# Patient Record
Sex: MALE | Race: BLACK OR AFRICAN AMERICAN | Employment: OTHER
[De-identification: names, ages, dates, MRNs, and addresses within clinical notes are randomized per-mention and may not be internally consistent; named-entity substitution may affect disease eponyms.]

---

## 2024-02-13 ENCOUNTER — HOSPITAL ENCOUNTER (OUTPATIENT)
Facility: HOSPITAL | Age: 54
Setting detail: SPECIMEN
Discharge: HOME | End: 2024-02-16
Payer: MEDICAID

## 2024-02-13 ENCOUNTER — OFFICE VISIT (OUTPATIENT)
Facility: CLINIC | Age: 54
End: 2024-02-13
Payer: MEDICAID

## 2024-02-13 VITALS
TEMPERATURE: 98.9 F | SYSTOLIC BLOOD PRESSURE: 120 MMHG | OXYGEN SATURATION: 96 % | DIASTOLIC BLOOD PRESSURE: 80 MMHG | WEIGHT: 247 LBS | HEIGHT: 67 IN | HEART RATE: 77 BPM | RESPIRATION RATE: 16 BRPM | BODY MASS INDEX: 38.77 KG/M2

## 2024-02-13 DIAGNOSIS — Z11.59 NEED FOR HEPATITIS C SCREENING TEST: ICD-10-CM

## 2024-02-13 DIAGNOSIS — G40.909 SEIZURE DISORDER (HCC): ICD-10-CM

## 2024-02-13 DIAGNOSIS — Z11.4 ENCOUNTER FOR SCREENING FOR HIV: ICD-10-CM

## 2024-02-13 DIAGNOSIS — M25.532 LEFT WRIST PAIN: ICD-10-CM

## 2024-02-13 DIAGNOSIS — I10 ESSENTIAL HYPERTENSION: Primary | ICD-10-CM

## 2024-02-13 DIAGNOSIS — E66.9 OBESITY (BMI 30-39.9): ICD-10-CM

## 2024-02-13 DIAGNOSIS — I10 ESSENTIAL HYPERTENSION: ICD-10-CM

## 2024-02-13 LAB
ALBUMIN SERPL-MCNC: 3.7 G/DL (ref 3.4–5)
ALBUMIN/GLOB SERPL: 1.2 (ref 0.8–1.7)
ALP SERPL-CCNC: 91 U/L (ref 45–117)
ALT SERPL-CCNC: 19 U/L (ref 16–61)
ANION GAP SERPL CALC-SCNC: 5 MMOL/L (ref 3–18)
AST SERPL-CCNC: 10 U/L (ref 10–38)
BILIRUB SERPL-MCNC: 0.4 MG/DL (ref 0.2–1)
BUN SERPL-MCNC: 22 MG/DL (ref 7–18)
BUN/CREAT SERPL: 22 (ref 12–20)
CALCIUM SERPL-MCNC: 9 MG/DL (ref 8.5–10.1)
CHLORIDE SERPL-SCNC: 116 MMOL/L (ref 100–111)
CHOLEST SERPL-MCNC: 167 MG/DL
CO2 SERPL-SCNC: 25 MMOL/L (ref 21–32)
CREAT SERPL-MCNC: 0.98 MG/DL (ref 0.6–1.3)
EST. AVERAGE GLUCOSE BLD GHB EST-MCNC: 126 MG/DL
GLOBULIN SER CALC-MCNC: 3.2 G/DL (ref 2–4)
GLUCOSE SERPL-MCNC: 88 MG/DL (ref 74–99)
HBA1C MFR BLD: 6 % (ref 4.2–5.6)
HDLC SERPL-MCNC: 50 MG/DL (ref 40–60)
HDLC SERPL: 3.3 (ref 0–5)
LDLC SERPL CALC-MCNC: 100.8 MG/DL (ref 0–100)
LIPID PANEL: ABNORMAL
POTASSIUM SERPL-SCNC: 3.7 MMOL/L (ref 3.5–5.5)
PROT SERPL-MCNC: 6.9 G/DL (ref 6.4–8.2)
SODIUM SERPL-SCNC: 146 MMOL/L (ref 136–145)
T4 FREE SERPL-MCNC: 0.9 NG/DL (ref 0.7–1.5)
TRIGL SERPL-MCNC: 81 MG/DL
TSH SERPL DL<=0.05 MIU/L-ACNC: 1.52 UIU/ML (ref 0.36–3.74)
VLDLC SERPL CALC-MCNC: 16.2 MG/DL

## 2024-02-13 PROCEDURE — 99203 OFFICE O/P NEW LOW 30 MIN: CPT | Performed by: FAMILY MEDICINE

## 2024-02-13 PROCEDURE — 80061 LIPID PANEL: CPT

## 2024-02-13 PROCEDURE — 84439 ASSAY OF FREE THYROXINE: CPT

## 2024-02-13 PROCEDURE — 3074F SYST BP LT 130 MM HG: CPT | Performed by: FAMILY MEDICINE

## 2024-02-13 PROCEDURE — 84443 ASSAY THYROID STIM HORMONE: CPT

## 2024-02-13 PROCEDURE — 86803 HEPATITIS C AB TEST: CPT

## 2024-02-13 PROCEDURE — 87389 HIV-1 AG W/HIV-1&-2 AB AG IA: CPT

## 2024-02-13 PROCEDURE — 36415 COLL VENOUS BLD VENIPUNCTURE: CPT

## 2024-02-13 PROCEDURE — 80053 COMPREHEN METABOLIC PANEL: CPT

## 2024-02-13 PROCEDURE — 83036 HEMOGLOBIN GLYCOSYLATED A1C: CPT

## 2024-02-13 PROCEDURE — 3079F DIAST BP 80-89 MM HG: CPT | Performed by: FAMILY MEDICINE

## 2024-02-13 RX ORDER — HYDROCODONE BITARTRATE AND ACETAMINOPHEN 5; 325 MG/1; MG/1
TABLET ORAL
COMMUNITY

## 2024-02-13 RX ORDER — PREGABALIN 150 MG/1
1 CAPSULE ORAL 2 TIMES DAILY
COMMUNITY

## 2024-02-13 RX ORDER — TAMSULOSIN HYDROCHLORIDE 0.4 MG/1
1 CAPSULE ORAL DAILY
COMMUNITY

## 2024-02-13 RX ORDER — DOCUSATE SODIUM 100 MG/1
100 CAPSULE, LIQUID FILLED ORAL 2 TIMES DAILY
COMMUNITY

## 2024-02-13 RX ORDER — LOSARTAN POTASSIUM 25 MG/1
1 TABLET ORAL DAILY
COMMUNITY

## 2024-02-13 RX ORDER — SOLIFENACIN SUCCINATE 10 MG/1
1 TABLET, FILM COATED ORAL DAILY
COMMUNITY

## 2024-02-13 RX ORDER — VITAMIN E 268 MG
400 CAPSULE ORAL DAILY
COMMUNITY

## 2024-02-13 RX ORDER — CELECOXIB 100 MG/1
CAPSULE ORAL
COMMUNITY

## 2024-02-13 RX ORDER — FUROSEMIDE 20 MG/1
1 TABLET ORAL DAILY
COMMUNITY

## 2024-02-13 NOTE — PROGRESS NOTES
\"Have you been to the ER, urgent care clinic since your last visit?  Hospitalized since your last visit?\"    NO    “Have you seen or consulted any other health care providers outside of [facility] since your last visit?”    NO    “Have you had a colorectal cancer screening such as a colonoscopy/FIT/Cologuard?    YES - Type: Colonoscopy - Where: [place], [place] about 3yrs ago  Nurse/CMA to request most recent records if not in the chart     Jarred Sorto presents today for   Chief Complaint   Patient presents with    New Patient       Is someone accompanying this pt? Sister-in-Law     Is the patient using any DME equipment during OV? Cane     Depression Screenin/13/2024     9:42 AM   PHQ-9 Questionaire   Little interest or pleasure in doing things 0   Feeling down, depressed, or hopeless 0   PHQ-9 Total Score 0         2024     9:42 AM   PHQ Scores   PHQ2 Score 0   PHQ9 Score 0       Learning Assessment:  No question data found.    Abuse Screening:       No data to display                Fall Risk       No data to display                OPIOID RISK TOOL       No data to display

## 2024-02-13 NOTE — PROGRESS NOTES
Jarred Sorto (:  1970) is a 54 y.o. male,New patient, here for evaluation of the following chief complaint(s):  New Patient         ASSESSMENT/PLAN:  1. Essential hypertension  -     Lipid Panel; Future  -     Comprehensive Metabolic Panel; Future  -     Hemoglobin A1C; Future  -     TSH + Free T4 Panel; Future  2. Obesity (BMI 30-39.9)  -     Lipid Panel; Future  -     Comprehensive Metabolic Panel; Future  -     Hemoglobin A1C; Future  -     TSH + Free T4 Panel; Future  3. Seizure disorder (HCC)  4. Left wrist pain  5. Need for hepatitis C screening test  -     Hepatitis C Antibody; Future  6. Encounter for screening for HIV  -     HIV 1/2 Ag/Ab, 4TH Generation,W Rflx Confirm; Future      HTN-   Controlled.   Patient to continue current medication and doses.  Continue to modify diet.  Limit salt intake to 2 grams  a day.  Advised  aerobic exercise for 30 minutes 3 to 5 times a week.  Take meds consistently as prescribed.      Obesity- Patient encouraged to exercise for 30 minutes 3 to 5 times a week.  Educated on eating a well balanced healthy diet. (Consistent of lean meats, lots of fruits, vegetables and whole grains).  Limit processed foods.            Return in about 6 months (around 2024) for HTN, obesity, OV extended.         Subjective   SUBJECTIVE/OBJECTIVE:  This is a 54-year-old male who presents to establish care.  He is a former patient of [facility].  He was previously being followed by Dr. Amanda Gallardo.    Patient's care team:  Neurologist-Dr. Eric Goldberg  Pain management-Dr. Marcie Pleitez  ENT-Dr. Cassie Wong        HTN-  The patient's blood pressure was noted to be controlled today.  Initially when he first came in and it was slightly elevated but once he settled down on repeat blood pressure was noted to be 120/80.  He continues with furosemide 20 mg daily and losartan 25 mg daily.  He is no longer taking HCTZ 25 mg.  He is getting less sodium and

## 2024-02-14 LAB
HCV AB SER IA-ACNC: 0.1 INDEX
HCV AB SERPL QL IA: NEGATIVE
HEPATITIS C COMMENT: NORMAL
HIV 1+2 AB+HIV1 P24 AG SERPL QL IA: NONREACTIVE
HIV 1/2 RESULT COMMENT: NORMAL

## 2024-05-07 ENCOUNTER — TELEPHONE (OUTPATIENT)
Facility: CLINIC | Age: 54
End: 2024-05-07

## 2024-05-07 DIAGNOSIS — G47.33 OSA (OBSTRUCTIVE SLEEP APNEA): Primary | ICD-10-CM

## 2024-05-07 NOTE — TELEPHONE ENCOUNTER
Patient's brother is calling in asking to please be referred to someone who can see the patient for sleep apnea.  States they are trying to get the patient back on a cpap machine.

## 2024-05-09 NOTE — TELEPHONE ENCOUNTER
Orders Placed This Encounter    [facility] - Amanda Ledezma DO, Sleep Medicine     Referral Priority:   Routine     Referral Type:   Eval and Treat     Referral Reason:   Specialty Services Required     Referred to Provider:   Amanda Ledezma DO     Requested Specialty:   Sleep Medicine Physician     Number of Visits Requested:   1     Please inform patient or patient's brother that his sleep medicine referral was created.  Someone should be contacting him in regards to this referral.

## 2024-06-10 ENCOUNTER — TELEPHONE (OUTPATIENT)
Facility: CLINIC | Age: 54
End: 2024-06-10

## 2024-06-10 NOTE — TELEPHONE ENCOUNTER
Patient's brother is calling in ststing the pharmcy told him to call the office in refence to the patient refill on   tamsulosin (FLOMAX) 0.4 MG capsule [[id number]]    Order Details  Dose: 1 tablet Route: Oral Frequency: DAILY   Dispense Quantity: -- Refills: --          Sig: Take 1 tablet by mouth daily       States the refill is not due until 6/29 but the patient is out of medication..    [facility] on file

## 2024-06-27 NOTE — TELEPHONE ENCOUNTER
Last Appointment:  2/13/2024  Future Appointments   Date Time Provider Department Center   8/12/2024 10:15 AM Naz Pena MD [facility] BS AMB   11/18/2024  9:00 AM Amanda Ledezma DO [facility] BS AMB

## 2024-06-28 RX ORDER — LOSARTAN POTASSIUM 25 MG/1
25 TABLET ORAL DAILY
Qty: 90 TABLET | Refills: 1 | Status: SHIPPED | OUTPATIENT
Start: 2024-06-28

## 2024-07-11 DIAGNOSIS — N40.1 BENIGN PROSTATIC HYPERPLASIA WITH LOWER URINARY TRACT SYMPTOMS, SYMPTOM DETAILS UNSPECIFIED: Primary | ICD-10-CM

## 2024-07-12 RX ORDER — SOLIFENACIN SUCCINATE 10 MG/1
10 TABLET, FILM COATED ORAL DAILY
Qty: 90 TABLET | Refills: 1 | Status: SHIPPED | OUTPATIENT
Start: 2024-07-12

## 2024-07-12 NOTE — TELEPHONE ENCOUNTER
Last Appointment:  2/13/2024  Future Appointments   Date Time Provider Department Center   8/12/2024 10:15 AM Naz Pena MD [facility] AMB   11/18/2024  9:00 AM Amanda Ledezma DO [facility] BS AMB      
Orders Placed This Encounter    solifenacin (VESICARE) 10 MG tablet     Sig: Take 1 tablet by mouth daily     Dispense:  90 tablet     Refill:  1       
20

## 2024-08-07 DIAGNOSIS — I10 ESSENTIAL HYPERTENSION: Primary | ICD-10-CM

## 2024-08-07 NOTE — TELEPHONE ENCOUNTER
Pharmacy fax request for refill to the [facility] on file.    furosemide (LASIX) 20 MG tablet [[id number]]    Order Details  Dose: 1 tablet Route: Oral Frequency: DAILY   Dispense Quantity: -- Refills: --          Sig: Take 1 tablet by mouth daily     Last Appointment:  2/13/2024  Future Appointments   Date Time Provider Department Center   8/12/2024 10:15 AM Naz Pena MD [facility] DEP   8/12/2024 10:30 AM Naz Pena MD [facility] DEP   11/18/2024  9:00 AM [facility]

## 2024-08-08 RX ORDER — FUROSEMIDE 20 MG/1
20 TABLET ORAL DAILY
Qty: 90 TABLET | Refills: 1 | Status: SHIPPED | OUTPATIENT
Start: 2024-08-08

## 2024-08-12 ENCOUNTER — HOSPITAL ENCOUNTER (OUTPATIENT)
Facility: HOSPITAL | Age: 54
Setting detail: SPECIMEN
Discharge: HOME | End: 2024-08-15
Payer: MEDICARE

## 2024-08-12 ENCOUNTER — OFFICE VISIT (OUTPATIENT)
Facility: CLINIC | Age: 54
End: 2024-08-12
Payer: MEDICARE

## 2024-08-12 VITALS
SYSTOLIC BLOOD PRESSURE: 120 MMHG | WEIGHT: 253 LBS | BODY MASS INDEX: 39.71 KG/M2 | HEIGHT: 67 IN | RESPIRATION RATE: 13 BRPM | OXYGEN SATURATION: 99 % | DIASTOLIC BLOOD PRESSURE: 82 MMHG | TEMPERATURE: 96.9 F | HEART RATE: 52 BPM

## 2024-08-12 DIAGNOSIS — E66.01 SEVERE OBESITY (BMI 35.0-39.9) WITH COMORBIDITY (HCC): ICD-10-CM

## 2024-08-12 DIAGNOSIS — I10 ESSENTIAL HYPERTENSION: Primary | ICD-10-CM

## 2024-08-12 DIAGNOSIS — R73.03 PREDIABETES: ICD-10-CM

## 2024-08-12 DIAGNOSIS — G40.909 SEIZURE DISORDER (HCC): ICD-10-CM

## 2024-08-12 DIAGNOSIS — M25.532 LEFT WRIST PAIN: ICD-10-CM

## 2024-08-12 DIAGNOSIS — G47.33 OSA (OBSTRUCTIVE SLEEP APNEA): ICD-10-CM

## 2024-08-12 DIAGNOSIS — Z00.00 MEDICARE ANNUAL WELLNESS VISIT, SUBSEQUENT: Primary | ICD-10-CM

## 2024-08-12 DIAGNOSIS — I10 ESSENTIAL HYPERTENSION: ICD-10-CM

## 2024-08-12 LAB
ANION GAP SERPL CALC-SCNC: 10 MMOL/L (ref 3–18)
BUN SERPL-MCNC: 13 MG/DL (ref 7–18)
BUN/CREAT SERPL: 14 (ref 12–20)
CALCIUM SERPL-MCNC: 9 MG/DL (ref 8.5–10.1)
CHLORIDE SERPL-SCNC: 112 MMOL/L (ref 100–111)
CO2 SERPL-SCNC: 22 MMOL/L (ref 21–32)
CREAT SERPL-MCNC: 0.9 MG/DL (ref 0.6–1.3)
EST. AVERAGE GLUCOSE BLD GHB EST-MCNC: 126 MG/DL
GLUCOSE SERPL-MCNC: 83 MG/DL (ref 74–99)
HBA1C MFR BLD: 6 % (ref 4.2–5.6)
POTASSIUM SERPL-SCNC: 3.8 MMOL/L (ref 3.5–5.5)
SODIUM SERPL-SCNC: 144 MMOL/L (ref 136–145)

## 2024-08-12 PROCEDURE — 3079F DIAST BP 80-89 MM HG: CPT | Performed by: FAMILY MEDICINE

## 2024-08-12 PROCEDURE — G0439 PPPS, SUBSEQ VISIT: HCPCS | Performed by: FAMILY MEDICINE

## 2024-08-12 PROCEDURE — 83036 HEMOGLOBIN GLYCOSYLATED A1C: CPT

## 2024-08-12 PROCEDURE — 3074F SYST BP LT 130 MM HG: CPT | Performed by: FAMILY MEDICINE

## 2024-08-12 PROCEDURE — 36415 COLL VENOUS BLD VENIPUNCTURE: CPT

## 2024-08-12 PROCEDURE — 99213 OFFICE O/P EST LOW 20 MIN: CPT | Performed by: FAMILY MEDICINE

## 2024-08-12 PROCEDURE — 80048 BASIC METABOLIC PNL TOTAL CA: CPT

## 2024-08-12 SDOH — ECONOMIC STABILITY: FOOD INSECURITY
WITHIN THE PAST 12 MONTHS, YOU WORRIED THAT YOUR FOOD WOULD RUN OUT BEFORE YOU GOT MONEY TO BUY MORE.: PATIENT UNABLE TO ANSWER

## 2024-08-12 SDOH — ECONOMIC STABILITY: FOOD INSECURITY
WITHIN THE PAST 12 MONTHS, THE FOOD YOU BOUGHT JUST DIDN'T LAST AND YOU DIDN'T HAVE MONEY TO GET MORE.: PATIENT UNABLE TO ANSWER

## 2024-08-12 SDOH — ECONOMIC STABILITY: INCOME INSECURITY
HOW HARD IS IT FOR YOU TO PAY FOR THE VERY BASICS LIKE FOOD, HOUSING, MEDICAL CARE, AND HEATING?: PATIENT UNABLE TO ANSWER

## 2024-08-12 ASSESSMENT — PATIENT HEALTH QUESTIONNAIRE - PHQ9
SUM OF ALL RESPONSES TO PHQ QUESTIONS 1-9: 0
1. LITTLE INTEREST OR PLEASURE IN DOING THINGS: NOT AT ALL
SUM OF ALL RESPONSES TO PHQ QUESTIONS 1-9: 0
DEPRESSION UNABLE TO ASSESS: FUNCTIONAL CAPACITY MOTIVATION LIMITS ACCURACY
2. FEELING DOWN, DEPRESSED OR HOPELESS: NOT AT ALL
SUM OF ALL RESPONSES TO PHQ9 QUESTIONS 1 & 2: 0
SUM OF ALL RESPONSES TO PHQ QUESTIONS 1-9: 0
SUM OF ALL RESPONSES TO PHQ QUESTIONS 1-9: 0

## 2024-08-12 ASSESSMENT — LIFESTYLE VARIABLES
HOW MANY STANDARD DRINKS CONTAINING ALCOHOL DO YOU HAVE ON A TYPICAL DAY: PATIENT DOES NOT DRINK
HOW OFTEN DO YOU HAVE A DRINK CONTAINING ALCOHOL: NEVER

## 2024-08-12 ASSESSMENT — ENCOUNTER SYMPTOMS: SHORTNESS OF BREATH: 0

## 2024-08-12 NOTE — PROGRESS NOTES
Jarred Sorto (:  1970) is a 54 y.o. male,Established patient, here for evaluation of the following chief complaint(s):  Follow-up, Obesity, and Hypertension         Assessment & Plan  Essential hypertension  Well-controlled, continue current medications, continue current treatment plan, medication adherence emphasized, and lifestyle modifications recommended    Orders:    Basic Metabolic Panel; Future    Prediabetes  Well-controlled, continue current medications, continue current treatment plan, medication adherence emphasized, and lifestyle modifications recommended    Orders:    Hemoglobin A1C; Future    Severe obesity (BMI 35.0-39.9) with comorbidity (HCC)     -Recommend a low calorie diet  -Patient encouraged to exercise for 30 minutes 3 to 5 times a week.  -Recommend eating a well balanced healthy diet. (Consistent of lean meats, lots of fruits, vegetables and whole grains).    -Limit processed foods.   -Drink 32 to 64 ounces of fluid each day  -Eat 2 to 3 g of fiber each day    Orders:    Basic Metabolic Panel; Future    Seizure disorder (HCC)   Monitored by specialist- no acute findings meriting change in the plan         LAURO (obstructive sleep apnea)   Monitored by specialist- no acute findings meriting change in the plan  Has apt. on 2024 for follow dup.   May need a new CPAP since current one is very old.            Left wrist pain   Uncontrolled, continue current medications.  Referred to Hand specialist for further evaluation and trreatment    Orders:    [facility]      Return in about 6 months (around 2025) for HTN, PREDIABETES, Sz dixorder, LAURO.       Subjective   This is a 54-year-old male who presents to establish care.  He is a former patient of [facility].  He was previously being followed by Dr. Amanda Gallardo.    Patient's care team:  Neurologist-Dr. Eric Goldberg  Pain management-Dr. Jack

## 2024-08-12 NOTE — PROGRESS NOTES
\"Have you been to the ER, urgent care clinic since your last visit?  Hospitalized since your last visit?\"    NO    “Have you seen or consulted any other health care providers outside of [facility] since your last visit?”    YES - When: approximately 1 months ago.  Where and Why: Dentist.        “Have you had a colorectal cancer screening such as a colonoscopy/FIT/Cologuard?    NO    No colonoscopy on file  No cologuard on file  No FIT/FOBT on file   No flexible sigmoidoscopy on file         Click Here for Release of Records Request

## 2024-08-12 NOTE — PROGRESS NOTES
Medicare Annual Wellness Visit    Jarred Sorto is here for Medicare AWV    Assessment & Plan   Medicare annual wellness visit, subsequent    Recommendations for Preventive Services Due: see orders and patient instructions/AVS.  Recommended screening schedule for the next 5-10 years is provided to the patient in written form: see Patient Instructions/AVS.     Return in 1 year (on 8/12/2025) for Medicare AWV.     Subjective       Patient's complete Health Risk Assessment and screening values have been reviewed and are found in Flowsheets. The following problems were reviewed today and where indicated follow up appointments were made and/or referrals ordered.    Positive Risk Factor Screenings with Interventions:        Depression:  PHQ-2 Score: 0  PHQ-9 Total Score: 0          Controlled Medication Review:    Today's Pain Level: Pain Score: Five     Opioid Risk: (Low risk score <55) Opioid risk score: 21    Patient is low risk for opioid use disorder or overdose.    Last PDMP Ayo as Reviewed:  Review User Review Instant Review Result                     Abnormal BMI (obese):  There is no height or weight on file to calculate BMI. (!) Abnormal  Interventions:  See AVS for additional education material                           Objective   There were no vitals filed for this visit.   There is no height or weight on file to calculate BMI.                    Allergies   Allergen Reactions    Bee Pollen      Cogestion     Prior to Visit Medications    Medication Sig Taking? Authorizing Provider   furosemide (LASIX) 20 MG tablet Take 1 tablet by mouth daily  Naz Pena MD   solifenacin (VESICARE) 10 MG tablet Take 1 tablet by mouth daily  Naz Pena MD   losartan (COZAAR) 25 MG tablet Take 1 tablet by mouth daily  Naz Pena MD   HYDROcodone-acetaminophen (NORCO) 5-325 MG per tablet TAKE 1 TABLET BY MOUTH EVERY 6 HOURS FOR 7 DAYS AS NEEDED  Provider, MD Cj   pregabalin (LYRICA) 150

## 2024-08-26 DIAGNOSIS — G62.9 NEUROPATHY: Primary | ICD-10-CM

## 2024-08-26 RX ORDER — PREGABALIN 150 MG/1
150 CAPSULE ORAL 2 TIMES DAILY
Qty: 180 CAPSULE | Refills: 1 | Status: SHIPPED | OUTPATIENT
Start: 2024-08-26 | End: 2025-02-22

## 2024-08-26 NOTE — PROGRESS NOTES
Orders Placed This Encounter    pregabalin (LYRICA) 150 MG capsule     Sig: Take 1 capsule by mouth 2 times daily for 180 days. Max Daily Amount: 300 mg     Dispense:  180 capsule     Refill:  1

## 2024-09-06 ENCOUNTER — OFFICE VISIT (OUTPATIENT)
Age: 54
End: 2024-09-06
Payer: MEDICAID

## 2024-09-06 VITALS — BODY MASS INDEX: 39.43 KG/M2 | HEIGHT: 67 IN | WEIGHT: 251.2 LBS

## 2024-09-06 DIAGNOSIS — M19.132 POST-TRAUMATIC ARTHRITIS OF LEFT WRIST: Primary | ICD-10-CM

## 2024-09-06 PROCEDURE — G8417 CALC BMI ABV UP PARAM F/U: HCPCS | Performed by: ORTHOPAEDIC SURGERY

## 2024-09-06 PROCEDURE — 1036F TOBACCO NON-USER: CPT | Performed by: ORTHOPAEDIC SURGERY

## 2024-09-06 PROCEDURE — 20605 DRAIN/INJ JOINT/BURSA W/O US: CPT | Performed by: ORTHOPAEDIC SURGERY

## 2024-09-06 PROCEDURE — 73110 X-RAY EXAM OF WRIST: CPT | Performed by: ORTHOPAEDIC SURGERY

## 2024-09-06 PROCEDURE — 3017F COLORECTAL CA SCREEN DOC REV: CPT | Performed by: ORTHOPAEDIC SURGERY

## 2024-09-06 PROCEDURE — G8427 DOCREV CUR MEDS BY ELIG CLIN: HCPCS | Performed by: ORTHOPAEDIC SURGERY

## 2024-09-06 PROCEDURE — 99204 OFFICE O/P NEW MOD 45 MIN: CPT | Performed by: ORTHOPAEDIC SURGERY

## 2024-09-06 RX ORDER — HYDROCHLOROTHIAZIDE 25 MG/1
25 TABLET ORAL DAILY
COMMUNITY

## 2024-09-06 RX ORDER — MELOXICAM 7.5 MG/1
7.5 TABLET ORAL DAILY PRN
Qty: 30 TABLET | Refills: 0 | Status: SHIPPED | OUTPATIENT
Start: 2024-09-06

## 2024-09-06 RX ORDER — MELOXICAM 15 MG/1
15 TABLET ORAL DAILY
Qty: 30 TABLET | Refills: 0 | Status: CANCELLED | OUTPATIENT
Start: 2024-09-06

## 2024-09-06 RX ORDER — LIDOCAINE HYDROCHLORIDE 10 MG/ML
0.5 INJECTION, SOLUTION INFILTRATION; PERINEURAL ONCE
Status: COMPLETED | OUTPATIENT
Start: 2024-09-06 | End: 2024-09-06

## 2024-09-06 RX ADMIN — LIDOCAINE HYDROCHLORIDE 0.5 ML: 10 INJECTION, SOLUTION INFILTRATION; PERINEURAL at 10:39
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
## 2024-10-06 DIAGNOSIS — M19.132 POST-TRAUMATIC ARTHRITIS OF LEFT WRIST: ICD-10-CM

## 2024-10-07 RX ORDER — MELOXICAM 7.5 MG/1
7.5 TABLET ORAL DAILY PRN
Qty: 30 TABLET | Refills: 0 | OUTPATIENT
Start: 2024-10-07

## 2024-10-08 RX ORDER — TOPIRAMATE 200 MG/1
200 TABLET, FILM COATED ORAL 2 TIMES DAILY
Qty: 180 TABLET | Refills: 1 | Status: SHIPPED | OUTPATIENT
Start: 2024-10-08 | End: 2025-04-06

## 2024-10-08 NOTE — TELEPHONE ENCOUNTER
Patient is needing refill on      topiramate (TOPAMAX) 200 MG tablet [[id number]]    Order Details  Dose: 1 tablet Route: Oral Frequency: 2 TIMES DAILY   Dispense Quantity: -- Refills: --          Sig: Take 1 tablet by mouth 2 times daily       Future Appointments   Date Time Provider Department Center   11/18/2024  9:00 AM Amanda Ledezma DO [facility] BS [facility]   2/18/2025 10:15 AM Naz Pena MD [facility] DEP

## 2024-10-09 NOTE — TELEPHONE ENCOUNTER
Orders Placed This Encounter    topiramate (TOPAMAX) 200 MG tablet     Sig: Take 1 tablet by mouth 2 times daily     Dispense:  180 tablet     Refill:  1

## 2024-11-05 DIAGNOSIS — M25.532 LEFT WRIST PAIN: Primary | ICD-10-CM

## 2024-11-05 NOTE — TELEPHONE ENCOUNTER
Patient's brother is calling in for a refill to the [facility] on file.      celecoxib (CELEBREX) 100 MG capsule [[id number]]    Order Details  Dose, Route, Frequency: As Directed   Dispense Quantity: -- Refills: --          Sig: TAKE 1 CAPSULE BY MOUTH TWICE DAILY WITH FOOD       Last Appointment:  8/12/2024  Future Appointments   Date Time Provider Department Center   11/18/2024  9:00 AM Amanda Ledezma DO [facility] BS [facility]   2/18/2025 10:15 AM [facility]

## 2024-11-06 ENCOUNTER — COMMUNITY OUTREACH (OUTPATIENT)
Facility: CLINIC | Age: 54
End: 2024-11-06

## 2024-11-06 RX ORDER — CELECOXIB 100 MG/1
100 CAPSULE ORAL 2 TIMES DAILY
Qty: 180 CAPSULE | Refills: 1 | Status: SHIPPED | OUTPATIENT
Start: 2024-11-06

## 2024-11-06 NOTE — TELEPHONE ENCOUNTER
Orders Placed This Encounter    celecoxib (CELEBREX) 100 MG capsule     Sig: Take 1 capsule by mouth 2 times daily     Dispense:  180 capsule     Refill:  1

## 2024-11-18 ENCOUNTER — OFFICE VISIT (OUTPATIENT)
Age: 54
End: 2024-11-18
Payer: MEDICAID

## 2024-11-18 VITALS
WEIGHT: 240.8 LBS | TEMPERATURE: 97.2 F | DIASTOLIC BLOOD PRESSURE: 88 MMHG | BODY MASS INDEX: 37.71 KG/M2 | HEART RATE: 80 BPM | OXYGEN SATURATION: 95 % | RESPIRATION RATE: 18 BRPM | SYSTOLIC BLOOD PRESSURE: 161 MMHG

## 2024-11-18 DIAGNOSIS — G40.909 SEIZURE DISORDER (HCC): ICD-10-CM

## 2024-11-18 DIAGNOSIS — S06.9XAS TRAUMATIC BRAIN INJURY, WITH UNKNOWN LOSS OF CONSCIOUSNESS STATUS, SEQUELA: ICD-10-CM

## 2024-11-18 DIAGNOSIS — G47.19 EXCESSIVE DAYTIME SLEEPINESS: ICD-10-CM

## 2024-11-18 DIAGNOSIS — G47.33 OSA (OBSTRUCTIVE SLEEP APNEA): Primary | ICD-10-CM

## 2024-11-18 DIAGNOSIS — R73.03 PREDIABETES: ICD-10-CM

## 2024-11-18 DIAGNOSIS — E66.9 OBESITY (BMI 30-39.9): ICD-10-CM

## 2024-11-18 PROBLEM — S06.9XAA TBI (TRAUMATIC BRAIN INJURY): Chronic | Status: ACTIVE | Noted: 2020-08-20

## 2024-11-18 PROBLEM — R56.9 SEIZURE (HCC): Status: ACTIVE | Noted: 2024-11-18

## 2024-11-18 PROCEDURE — 99204 OFFICE O/P NEW MOD 45 MIN: CPT | Performed by: OTOLARYNGOLOGY

## 2024-11-18 ASSESSMENT — SLEEP AND FATIGUE QUESTIONNAIRES
HOW LIKELY ARE YOU TO NOD OFF OR FALL ASLEEP WHILE SITTING INACTIVE IN A PUBLIC PLACE: MODERATE CHANCE OF DOZING
HOW LIKELY ARE YOU TO NOD OFF OR FALL ASLEEP WHILE LYING DOWN TO REST IN THE AFTERNOON WHEN CIRCUMSTANCES PERMIT: HIGH CHANCE OF DOZING
ESS TOTAL SCORE: 22
HOW LIKELY ARE YOU TO NOD OFF OR FALL ASLEEP WHILE SITTING AND TALKING TO SOMEONE: MODERATE CHANCE OF DOZING
HOW LIKELY ARE YOU TO NOD OFF OR FALL ASLEEP WHILE SITTING AND READING: HIGH CHANCE OF DOZING
HOW LIKELY ARE YOU TO NOD OFF OR FALL ASLEEP IN A CAR, WHILE STOPPED FOR A FEW MINUTES IN TRAFFIC: HIGH CHANCE OF DOZING
HOW LIKELY ARE YOU TO NOD OFF OR FALL ASLEEP WHILE WATCHING TV: HIGH CHANCE OF DOZING
HOW LIKELY ARE YOU TO NOD OFF OR FALL ASLEEP WHILE SITTING QUIETLY AFTER LUNCH WITHOUT ALCOHOL: HIGH CHANCE OF DOZING
HOW LIKELY ARE YOU TO NOD OFF OR FALL ASLEEP WHEN YOU ARE A PASSENGER IN A CAR FOR AN HOUR WITHOUT A BREAK: HIGH CHANCE OF DOZING

## 2024-11-18 NOTE — PROGRESS NOTES
Jarred Sorto presents today for   Chief Complaint   Patient presents with    New Patient     Referred by Dr.Karen Pena to establish new sleep provider       Is someone accompanying this pt? Brother    Is the patient using any DME equipment during OV? CPAP    -DME Company [employer]    Depression Screenin/12/2024    10:58 AM   PHQ-9 Questionaire   Little interest or pleasure in doing things 0   Feeling down, depressed, or hopeless 0   PHQ-9 Total Score 0       Learning Needs Questionnaire:     No question data found.      Fall Risk:         2024    10:56 AM   Fall Risk   Do you feel unsteady or are you worried about falling?  no   2 or more falls in past year? no   Fall with injury in past year? no        Abuse Screening:          No data to display                  Coordination of Care:    1. Have you been to the ER, urgent care clinic since your last visit? Hospitalized since your last visit? No    2. Have you seen or consulted any other health care providers outside of the [facility] since your last visit? Include any pap smears or colon screening. Neurology    Medication list has been update per patient.        
to typographical errors.  I apologize in advance if the situation occurs.  If questions arise please do not hesitate to contact me or call our department.    Electronically signed by Amanda Ledezma DO on11/18/2024 at 9:27 AM

## 2024-11-18 NOTE — ASSESSMENT & PLAN NOTE
The patient's [place] today was elevated, 22/24.  Interestingly when I ask him if he is sleepy during the day he stated \"no\".  Not entirely sure if the scale is accurate today.  He is sleeping adequate hours at night and is using his CPAP most of the time.  I pointed out that he does take some medications which could make him sleepy as well although he has been on these medications for several years according to his brother.

## 2024-11-18 NOTE — ASSESSMENT & PLAN NOTE
Monitored by specialist- no acute findings meriting change in the plan, Sees Neurology, Dr. Goldberg.

## 2024-11-18 NOTE — PATIENT INSTRUCTIONS
Please make a follow up appointment to discuss the results of your sleep study. If this is impossible for some reason, please send me a \"My Chart\" message so that I may get back with you in a timely manner.    The [facility] Sleep Lab is located in the [facility], adjacent to [facility]. The lab is on the second floor. The direct number to call for sleep study related questions is: [phone].    Please call our clinic back at [phone] or send a message on [employer] if you have any questions or concerns or if you are experiencing any of the following:     You have not received a follow up appointment within 30 days prior the recommended follow up time.    If you are not tolerating treatment plan and/or not able to obtain equipment or prescribed medication(s).  if you are experiencing any difficulties with the Durable Medical Equipment  (DME) Company you may be using or is assigned to you.  Two weeks have passed and you have not received an appointment for a scheduled procedure.  Two weeks have passed since you underwent a test and/or procedure and you have not received your results.     If you are using a CPAP/BIPAP, or Home Ventilator Device- Please note the following.  Currently, many DMEs are experiencing supply chain difficulties and orders for equipment may be back logged several weeks.     Your  Durable Medical Equipment (DME ) company is supposed to provide you with replacement filters, tubing and masks. You can either call your DME when you need new supplies or you can arrange for an automatic shipment schedule.    Your need to be seen by our office at lat minimum of every 12 months in order to renew the prescription for these supplies.   Please make note of who your DME company is and their phone number.   Please make sure that you clean your mask and hosing on a regular basis.  Your DME can provide you with additional information regarding proper care and cleaning of your

## 2024-11-19 DIAGNOSIS — N40.1 BENIGN PROSTATIC HYPERPLASIA WITH LOWER URINARY TRACT SYMPTOMS, SYMPTOM DETAILS UNSPECIFIED: Primary | ICD-10-CM

## 2024-11-19 NOTE — TELEPHONE ENCOUNTER
Pt requesting refill to [facility] to [id number] - [place], [place] - [address]  - P [phone] - F [phone]     Last Appointment:  8/12/2024  Future Appointments   Date Time Provider Department Center   2/18/2025 10:15 AM Naz Pena MD [facility]CC DEP          tamsulosin (FLOMAX) 0.4 MG capsule [[id number]]    Order Details  Dose: 1 tablet Route: Oral Frequency: DAILY   Dispense Quantity: -- Refills: --          Sig: Take 1 tablet by mouth daily

## 2024-11-20 RX ORDER — TAMSULOSIN HYDROCHLORIDE 0.4 MG/1
0.4 CAPSULE ORAL DAILY
Qty: 90 CAPSULE | Refills: 3 | Status: SHIPPED | OUTPATIENT
Start: 2024-11-20

## 2024-11-20 NOTE — TELEPHONE ENCOUNTER
Patient brother called in reference to refill request advised it was sent to the doctor a minute ago. Patient brother Caden wanted to know what could he do to avoid this. Advised to call in 2 weeks before medicine runs out.

## 2024-11-21 NOTE — TELEPHONE ENCOUNTER
Orders Placed This Encounter    tamsulosin (FLOMAX) 0.4 MG capsule     Sig: Take 1 capsule by mouth daily     Dispense:  90 capsule     Refill:  3     This Med refilled for 1 year.  Please inform patient and patient's brother.  Agree with calling 2 weeks prior to medication running out to obtain refill.

## 2024-12-26 DIAGNOSIS — G62.9 NEUROPATHY: ICD-10-CM

## 2024-12-26 DIAGNOSIS — I10 ESSENTIAL HYPERTENSION: Primary | ICD-10-CM

## 2024-12-26 RX ORDER — LOSARTAN POTASSIUM 25 MG/1
25 TABLET ORAL DAILY
Qty: 90 TABLET | Refills: 1 | Status: SHIPPED | OUTPATIENT
Start: 2024-12-26

## 2024-12-26 NOTE — TELEPHONE ENCOUNTER
Last Appointment:  8/12/2024  Future Appointments   Date Time Provider Department Center   1/3/2025 10:45 AM Chuck Long DO [facility] BS [facility]   2/18/2025 10:15 AM Naz Pena MD G[facility] BS ECC DEP

## 2024-12-26 NOTE — TELEPHONE ENCOUNTER
Last Appointment:  8/12/2024  Future Appointments   Date Time Provider Department Center   1/3/2025 10:45 AM Chuck Long DO [facility] BS [facility]   2/18/2025 10:15 AM Naz Pena MD [facility] BS ECC DEP

## 2024-12-26 NOTE — TELEPHONE ENCOUNTER
Pharmacy is requesting a refill for the following medications:  pregabalin (LYRICA) 150 MG capsule

## 2024-12-27 RX ORDER — PREGABALIN 150 MG/1
150 CAPSULE ORAL 2 TIMES DAILY
Qty: 180 CAPSULE | Refills: 1 | Status: SHIPPED | OUTPATIENT
Start: 2024-12-27 | End: 2025-06-25

## 2024-12-28 NOTE — TELEPHONE ENCOUNTER
Orders Placed This Encounter    pregabalin (LYRICA) 150 MG capsule     Sig: Take 1 capsule by mouth 2 times daily for 180 days. Max Daily Amount: 300 mg     Dispense:  180 capsule     Refill:  1     [place] reviewed and appropriate

## 2025-01-03 ENCOUNTER — OFFICE VISIT (OUTPATIENT)
Age: 55
End: 2025-01-03

## 2025-01-03 VITALS — HEIGHT: 68 IN | WEIGHT: 247 LBS | BODY MASS INDEX: 37.44 KG/M2

## 2025-01-03 DIAGNOSIS — M19.132 POST-TRAUMATIC ARTHRITIS OF LEFT WRIST: Primary | ICD-10-CM

## 2025-01-03 RX ORDER — LIDOCAINE HYDROCHLORIDE 10 MG/ML
0.5 INJECTION, SOLUTION INFILTRATION; PERINEURAL ONCE
Status: COMPLETED | OUTPATIENT
Start: 2025-01-03 | End: 2025-01-03

## 2025-01-03 RX ORDER — MELOXICAM 7.5 MG/1
7.5 TABLET ORAL DAILY PRN
Qty: 30 TABLET | Refills: 2 | Status: SHIPPED | OUTPATIENT
Start: 2025-01-03

## 2025-01-03 RX ADMIN — LIDOCAINE HYDROCHLORIDE 0.5 ML: 10 INJECTION, SOLUTION INFILTRATION; PERINEURAL at 10:53

## 2025-01-03 NOTE — PROGRESS NOTES
Psychiatric:         Mood and Affect: Mood normal.         Behavior: Behavior normal.          Wrist: On the left, edema and swelling significant especially along the dorsal radial aspect.    Tenderness L R Test L R   1st Ext Comp - - Finkelstein's - -   Snuff Box - - Lennon - -   2nd Ext Comp - - S-L Shear - -   S-L Joint - - L-T Shear - -   L-T Joint - - DRUJ Sup - -   6th Ext Comp - - DRUJ Pro - -   Ulnar Snuff - - DRUJ Grind - -   Fovea - - TFCC - -   STT Joint - - Mid-Carp Inst - -   FCR - - P-T Grind - -   Intersection - - ECU Sublux. - -      Dorsal Ganglion: -   Volar Ganglion: -      ROM: Full        Imaging / Diagnostics:       9/6/2024 3 views of left wrist independently reviewed on date of examination is significant for severe degenerative arthritis specially about the radial scaphoid joint.  There appears to be an apparent old healed distal radius fracture with some residual mild dorsal angulation loss of inclination as well as mild loss of radial height.      Impression:        Diagnosis Orders   1. Post-traumatic arthritis of left wrist  lidocaine 1 % injection 0.5 mL    triamcinolone acetonide (KENALOG) injection 5 mg    DRAIN/INJECT INTERMEDIATE JOINT/BURSA    meloxicam (MOBIC) 7.5 MG tablet            Plan:       Left wrist joint injection.    Continue Universal wrist brace.    Meloxicam prescribed again to help with inflammation as needed.    Plan was reviewed with patient, who verbalized agreement and understanding of the plan.     Return if symptoms worsen or fail to improve.     [facility] - [address], [address]  [facility] [address]   OFFICE PROCEDURE PROGRESS NOTE        Chart reviewed for the following:   Cuhck WU DO, have reviewed the History, Physical and updated the Allergic reactions for Jarred Sorto     TIME OUT performed immediately prior to start of procedure:   Chuck WU DO, have performed the

## 2025-01-08 DIAGNOSIS — N40.1 BENIGN PROSTATIC HYPERPLASIA WITH LOWER URINARY TRACT SYMPTOMS, SYMPTOM DETAILS UNSPECIFIED: ICD-10-CM

## 2025-01-08 NOTE — TELEPHONE ENCOUNTER
Patient is requesting refill on      solifenacin (VESICARE) 10 MG tablet [[id number]]    Order Details  Dose: 10 mg Route: Oral Frequency: DAILY   Dispense Quantity: 90 tablet Refills: 1          Sig: Take 1 tablet by mouth daily       Future Appointments   Date Time Provider Department Center   2/18/2025 10:15 AM Naz Pena MD GMA [facility] DEP

## 2025-01-09 RX ORDER — SOLIFENACIN SUCCINATE 10 MG/1
10 TABLET, FILM COATED ORAL DAILY
Qty: 90 TABLET | Refills: 1 | Status: SHIPPED | OUTPATIENT
Start: 2025-01-09

## 2025-01-09 NOTE — TELEPHONE ENCOUNTER
Orders Placed This Encounter    solifenacin (VESICARE) 10 MG tablet     Sig: Take 1 tablet by mouth daily     Dispense:  90 tablet     Refill:  1

## 2025-02-06 DIAGNOSIS — I10 ESSENTIAL HYPERTENSION: ICD-10-CM

## 2025-02-06 NOTE — TELEPHONE ENCOUNTER
Patient's brother is calling in for a refill, states the pharmacy has sent several requests with no response form us.      furosemide (LASIX) 20 MG tablet [[id number]]    Order Details  Dose: 20 mg Route: Oral Frequency: DAILY   Dispense Quantity: 90 tablet Refills: 1          Sig: Take 1 tablet by mouth daily       Last Appointment:  8/12/2024  Future Appointments   Date Time Provider Department Center   2/18/2025 10:15 AM Naz Pena MD GMA BS ECC DEP

## 2025-02-07 RX ORDER — FUROSEMIDE 20 MG/1
20 TABLET ORAL DAILY
Qty: 90 TABLET | Refills: 1 | Status: SHIPPED | OUTPATIENT
Start: 2025-02-07

## 2025-02-18 ENCOUNTER — OFFICE VISIT (OUTPATIENT)
Facility: CLINIC | Age: 55
End: 2025-02-18
Payer: MEDICAID

## 2025-02-18 ENCOUNTER — HOSPITAL ENCOUNTER (OUTPATIENT)
Facility: HOSPITAL | Age: 55
Setting detail: SPECIMEN
Discharge: HOME | End: 2025-02-21
Payer: MEDICARE

## 2025-02-18 VITALS
RESPIRATION RATE: 17 BRPM | HEART RATE: 68 BPM | DIASTOLIC BLOOD PRESSURE: 84 MMHG | WEIGHT: 246.6 LBS | BODY MASS INDEX: 38.71 KG/M2 | OXYGEN SATURATION: 98 % | HEIGHT: 67 IN | TEMPERATURE: 97.3 F | SYSTOLIC BLOOD PRESSURE: 127 MMHG

## 2025-02-18 DIAGNOSIS — N40.1 BENIGN PROSTATIC HYPERPLASIA WITH LOWER URINARY TRACT SYMPTOMS, SYMPTOM DETAILS UNSPECIFIED: ICD-10-CM

## 2025-02-18 DIAGNOSIS — R73.03 PREDIABETES: ICD-10-CM

## 2025-02-18 DIAGNOSIS — G40.909 SEIZURE DISORDER (HCC): ICD-10-CM

## 2025-02-18 DIAGNOSIS — I10 ESSENTIAL HYPERTENSION: ICD-10-CM

## 2025-02-18 DIAGNOSIS — Z12.5 PROSTATE CANCER SCREENING: ICD-10-CM

## 2025-02-18 DIAGNOSIS — E66.01 MORBID (SEVERE) OBESITY DUE TO EXCESS CALORIES: ICD-10-CM

## 2025-02-18 DIAGNOSIS — R73.03 PREDIABETES: Primary | ICD-10-CM

## 2025-02-18 DIAGNOSIS — G47.33 OSA (OBSTRUCTIVE SLEEP APNEA): ICD-10-CM

## 2025-02-18 LAB
ALBUMIN SERPL-MCNC: 3.7 G/DL (ref 3.4–5)
ALBUMIN/GLOB SERPL: 1.2 (ref 0.8–1.7)
ALP SERPL-CCNC: 98 U/L (ref 45–117)
ALT SERPL-CCNC: 18 U/L (ref 16–61)
ANION GAP SERPL CALC-SCNC: 6 MMOL/L (ref 3–18)
AST SERPL-CCNC: 10 U/L (ref 10–38)
BILIRUB SERPL-MCNC: 0.3 MG/DL (ref 0.2–1)
BUN SERPL-MCNC: 13 MG/DL (ref 7–18)
BUN/CREAT SERPL: 15 (ref 12–20)
CALCIUM SERPL-MCNC: 8.8 MG/DL (ref 8.5–10.1)
CHLORIDE SERPL-SCNC: 113 MMOL/L (ref 100–111)
CHOLEST SERPL-MCNC: 168 MG/DL
CO2 SERPL-SCNC: 24 MMOL/L (ref 21–32)
CREAT SERPL-MCNC: 0.84 MG/DL (ref 0.6–1.3)
CREAT UR-MCNC: 133 MG/DL (ref 30–125)
EST. AVERAGE GLUCOSE BLD GHB EST-MCNC: 120 MG/DL
GLOBULIN SER CALC-MCNC: 3.1 G/DL (ref 2–4)
GLUCOSE SERPL-MCNC: 86 MG/DL (ref 74–99)
HBA1C MFR BLD: 5.8 % (ref 4.2–5.6)
HDLC SERPL-MCNC: 47 MG/DL (ref 40–60)
HDLC SERPL: 3.6 (ref 0–5)
LDLC SERPL CALC-MCNC: 103.8 MG/DL (ref 0–100)
LIPID PANEL: ABNORMAL
MICROALBUMIN UR-MCNC: 0.9 MG/DL (ref 0–3)
MICROALBUMIN/CREAT UR-RTO: 7 MG/G (ref 0–30)
POTASSIUM SERPL-SCNC: 3.6 MMOL/L (ref 3.5–5.5)
PROT SERPL-MCNC: 6.8 G/DL (ref 6.4–8.2)
PSA SERPL-MCNC: 0.5 NG/ML (ref 0–4)
SODIUM SERPL-SCNC: 143 MMOL/L (ref 136–145)
TRIGL SERPL-MCNC: 86 MG/DL
VLDLC SERPL CALC-MCNC: 17.2 MG/DL

## 2025-02-18 PROCEDURE — 82043 UR ALBUMIN QUANTITATIVE: CPT

## 2025-02-18 PROCEDURE — G8427 DOCREV CUR MEDS BY ELIG CLIN: HCPCS | Performed by: FAMILY MEDICINE

## 2025-02-18 PROCEDURE — 3017F COLORECTAL CA SCREEN DOC REV: CPT | Performed by: FAMILY MEDICINE

## 2025-02-18 PROCEDURE — G0103 PSA SCREENING: HCPCS

## 2025-02-18 PROCEDURE — G8417 CALC BMI ABV UP PARAM F/U: HCPCS | Performed by: FAMILY MEDICINE

## 2025-02-18 PROCEDURE — 36415 COLL VENOUS BLD VENIPUNCTURE: CPT

## 2025-02-18 PROCEDURE — 99214 OFFICE O/P EST MOD 30 MIN: CPT | Performed by: FAMILY MEDICINE

## 2025-02-18 PROCEDURE — 3074F SYST BP LT 130 MM HG: CPT | Performed by: FAMILY MEDICINE

## 2025-02-18 PROCEDURE — 83036 HEMOGLOBIN GLYCOSYLATED A1C: CPT

## 2025-02-18 PROCEDURE — 3079F DIAST BP 80-89 MM HG: CPT | Performed by: FAMILY MEDICINE

## 2025-02-18 PROCEDURE — 1036F TOBACCO NON-USER: CPT | Performed by: FAMILY MEDICINE

## 2025-02-18 PROCEDURE — 82570 ASSAY OF URINE CREATININE: CPT

## 2025-02-18 PROCEDURE — 80061 LIPID PANEL: CPT

## 2025-02-18 PROCEDURE — 80053 COMPREHEN METABOLIC PANEL: CPT

## 2025-02-18 ASSESSMENT — PATIENT HEALTH QUESTIONNAIRE - PHQ9
SUM OF ALL RESPONSES TO PHQ QUESTIONS 1-9: 0
2. FEELING DOWN, DEPRESSED OR HOPELESS: NOT AT ALL
1. LITTLE INTEREST OR PLEASURE IN DOING THINGS: NOT AT ALL
SUM OF ALL RESPONSES TO PHQ QUESTIONS 1-9: 0
SUM OF ALL RESPONSES TO PHQ9 QUESTIONS 1 & 2: 0

## 2025-02-18 ASSESSMENT — ENCOUNTER SYMPTOMS: SHORTNESS OF BREATH: 0

## 2025-02-18 NOTE — ASSESSMENT & PLAN NOTE
-Recommend a low calorie diet  -Patient encouraged to exercise for 30 minutes 3 to 5 times a week.  -Recommend eating a well balanced healthy diet. (Consistent of lean meats, lots of fruits, vegetables and whole grains).    -Limit processed foods.   -Drink 32 to 64 ounces of fluid each day  -Eat 2 to 3 g of fiber each day

## 2025-02-18 NOTE — PROGRESS NOTES
\"Have you been to the ER, urgent care clinic since your last visit?  Hospitalized since your last visit?\"    NO    “Have you seen or consulted any other health care providers outside our system since your last visit?”    YES - When: approximately 2  weeks ago.  Where and Why: Orthopedic.

## 2025-02-18 NOTE — PROGRESS NOTES
Jarred Sorto (:  1970) is a 55 y.o. male,Established patient, here for evaluation of the following chief complaint(s):  Follow-up, Hypertension, PREDIABETES, Sleep Apnea, and Seizures         Assessment & Plan  Prediabetes  Last A1c- 6.0  Currently not on medication treatment.    Orders:    Hemoglobin A1C; Future    Essential hypertension  - Well-Controlled.   -Continue current medications, continue current treatment plan   Orders:    Lipid Panel; Future    Comprehensive Metabolic Panel; Future    Albumin/Creatinine Ratio, Urine; Future    LAURO (obstructive sleep apnea)  -Monitored by specialist- no acute findings meriting change in the plan  -On CPAP       Benign prostatic hyperplasia with lower urinary tract symptoms, symptom details unspecified   -On Tamsulosin 0.4 mg a day         Seizure disorder (HCC)  -Monitored by specialist- no acute findings meriting change in the plan        Prostate cancer screening  Orders:    PSA Screening; Future    Morbid (severe) obesity due to excess calories (E66.01)  -Recommend a low calorie diet  -Patient encouraged to exercise for 30 minutes 3 to 5 times a week.    -Recommend eating a well balanced healthy diet. (Consistent of lean meats, lots of fruits, vegetables and whole grains).    -Limit processed foods.   -Drink 32 to 64 ounces of fluid each day  -Eat 2 to 3 g of fiber each day        Body mass index [BMI] 38.0-38.9, adult (Z68.38)  -Recommend a low calorie diet  -Patient encouraged to exercise for 30 minutes 3 to 5 times a week.  -Recommend eating a well balanced healthy diet. (Consistent of lean meats, lots of fruits, vegetables and whole grains).    -Limit processed foods.   -Drink 32 to 64 ounces of fluid each day  -Eat 2 to 3 g of fiber each day         Return in about 6 months (around 2025) for OV extended, PREDIABETES, HTN, HLD, BPH, LAURO.       Subjective   This is a 55-year-old male     Patient's care team:  Neurologist-Dr. Eric Goldberg  Pain

## 2025-02-21 PROBLEM — I10 ESSENTIAL HYPERTENSION: Status: ACTIVE | Noted: 2025-02-21

## 2025-02-21 PROBLEM — N40.1 BENIGN PROSTATIC HYPERPLASIA WITH LOWER URINARY TRACT SYMPTOMS: Status: ACTIVE | Noted: 2025-02-21

## 2025-02-21 NOTE — ASSESSMENT & PLAN NOTE
- Well-Controlled.   -Continue current medications, continue current treatment plan   Orders:    Lipid Panel; Future    Comprehensive Metabolic Panel; Future    Albumin/Creatinine Ratio, Urine; Future

## 2025-03-25 DIAGNOSIS — M19.132 POST-TRAUMATIC ARTHRITIS OF LEFT WRIST: ICD-10-CM

## 2025-03-26 RX ORDER — MELOXICAM 7.5 MG/1
7.5 TABLET ORAL DAILY PRN
Qty: 30 TABLET | Refills: 2 | Status: SHIPPED | OUTPATIENT
Start: 2025-03-26

## 2025-04-10 DIAGNOSIS — G40.909 SEIZURE DISORDER (HCC): Primary | ICD-10-CM

## 2025-04-10 NOTE — TELEPHONE ENCOUNTER
Mr. Sorto is requesting refills of:    topiramate (TOPAMAX) 200 MG tablet [[id number]]            to be sent to   [id number] - [place], [place] - [address] [place] [address] [phone] - F [phone]  [address] [place] [id number]  Phone: [phone] Fax: [phone]  .     LAST OFFICE VISIT:  2/18/2025     UPCOMING APPOINTMENT(S):  Future Appointments   Date Time Provider Department Center   8/19/2025 10:15 AM Naz Pnea MD [facility]       Patient offered an appointment?  N/A  How much medication does the patient have on hand? 0    Provided notified

## 2025-04-11 RX ORDER — TOPIRAMATE 200 MG/1
200 TABLET, FILM COATED ORAL 2 TIMES DAILY
Qty: 180 TABLET | Refills: 1 | Status: SHIPPED | OUTPATIENT
Start: 2025-04-11 | End: 2025-10-08

## 2025-05-07 DIAGNOSIS — M25.532 LEFT WRIST PAIN: ICD-10-CM

## 2025-05-07 RX ORDER — CELECOXIB 100 MG/1
100 CAPSULE ORAL 2 TIMES DAILY
Qty: 180 CAPSULE | Refills: 1 | Status: SHIPPED | OUTPATIENT
Start: 2025-05-07

## 2025-05-07 NOTE — TELEPHONE ENCOUNTER
Pharmacy is requesting refill on    celecoxib (CELEBREX) 100 MG capsule [[id number]]    Order Details  Dose: 100 mg Route: Oral Frequency: 2 TIMES DAILY   Dispense Quantity: 180 capsule Refills: 1          Sig: Take 1 capsule by mouth 2 times daily       Future Appointments   Date Time Provider Department Center   8/19/2025 10:15 AM Naz Pena MD [facility]

## 2025-05-08 DIAGNOSIS — M25.532 LEFT WRIST PAIN: ICD-10-CM

## 2025-05-08 RX ORDER — CELECOXIB 100 MG/1
100 CAPSULE ORAL 2 TIMES DAILY
Qty: 180 CAPSULE | Refills: 1 | OUTPATIENT
Start: 2025-05-08

## 2025-05-08 NOTE — TELEPHONE ENCOUNTER
Mr. Sorto is requesting refills of:    Requested Prescriptions     Pending Prescriptions Disp Refills    celecoxib (CELEBREX) 100 MG capsule 180 capsule 1     Sig: Take 1 capsule by mouth 2 times daily         to be sent to   [id number] - [place], [place] - [address] [place] [address] [phone] - F [phone]  [address] [place] [id number]  Phone: [phone] Fax: [phone]  .     LAST OFFICE VISIT:  2/18/2025     UPCOMING APPOINTMENT(S):  Future Appointments   Date Time Provider Department Center   8/19/2025 10:15 AM Naz Pena MD [facility]       Patient offered an appointment? no  How much medication does the patient have on hand? unknown    Provided notified

## 2025-06-23 DIAGNOSIS — N40.1 BENIGN PROSTATIC HYPERPLASIA WITH LOWER URINARY TRACT SYMPTOMS, SYMPTOM DETAILS UNSPECIFIED: ICD-10-CM

## 2025-06-23 DIAGNOSIS — I10 ESSENTIAL HYPERTENSION: ICD-10-CM

## 2025-06-23 RX ORDER — SOLIFENACIN SUCCINATE 10 MG/1
10 TABLET, FILM COATED ORAL DAILY
Qty: 90 TABLET | Refills: 1 | Status: SHIPPED | OUTPATIENT
Start: 2025-06-23

## 2025-06-23 RX ORDER — LOSARTAN POTASSIUM 25 MG/1
25 TABLET ORAL DAILY
Qty: 90 TABLET | Refills: 1 | Status: SHIPPED | OUTPATIENT
Start: 2025-06-23

## 2025-06-24 NOTE — TELEPHONE ENCOUNTER
Orders Placed This Encounter    solifenacin (VESICARE) 10 MG tablet     Sig: Take 1 tablet by mouth daily     Dispense:  90 tablet     Refill:  1    losartan (COZAAR) 25 MG tablet     Sig: Take 1 tablet by mouth daily     Dispense:  90 tablet     Refill:  1

## 2025-06-30 DIAGNOSIS — I10 ESSENTIAL HYPERTENSION: ICD-10-CM

## 2025-06-30 RX ORDER — FUROSEMIDE 20 MG/1
20 TABLET ORAL DAILY
Qty: 90 TABLET | Refills: 1 | Status: SHIPPED | OUTPATIENT
Start: 2025-06-30

## 2025-06-30 NOTE — TELEPHONE ENCOUNTER
Mr. Sorto is requesting refills of:    Requested Prescriptions     Pending Prescriptions Disp Refills    furosemide (LASIX) 20 MG tablet 90 tablet 1     Sig: Take 1 tablet by mouth daily       **(Remove additional pharmacy names that are not being requested by right-clicking then select \"make the text editable and delete, as applicable.)**  to be sent to   [id number] - [place], [place] - [address] [place] [address] [phone] - F [phone]  [address] [place] [id number]  Phone: [phone] Fax: [phone]  .     LAST OFFICE VISIT:  2/18/2025     UPCOMING APPOINTMENT(S):  Future Appointments   Date Time Provider Department Center   8/19/2025 10:15 AM Naz Pena MD [facility]

## 2025-08-04 DIAGNOSIS — G40.909 SEIZURE DISORDER (HCC): ICD-10-CM

## 2025-08-04 DIAGNOSIS — M25.532 LEFT WRIST PAIN: ICD-10-CM

## 2025-08-04 DIAGNOSIS — N40.1 BENIGN PROSTATIC HYPERPLASIA WITH LOWER URINARY TRACT SYMPTOMS, SYMPTOM DETAILS UNSPECIFIED: ICD-10-CM

## 2025-08-05 RX ORDER — TAMSULOSIN HYDROCHLORIDE 0.4 MG/1
0.4 CAPSULE ORAL DAILY
Qty: 90 CAPSULE | Refills: 0 | Status: SHIPPED | OUTPATIENT
Start: 2025-08-05

## 2025-08-05 RX ORDER — TOPIRAMATE 200 MG/1
200 TABLET, FILM COATED ORAL 2 TIMES DAILY
Qty: 180 TABLET | Refills: 1 | Status: SHIPPED | OUTPATIENT
Start: 2025-08-05 | End: 2026-02-01

## 2025-08-05 RX ORDER — CELECOXIB 100 MG/1
100 CAPSULE ORAL 2 TIMES DAILY
Qty: 180 CAPSULE | Refills: 1 | Status: SHIPPED | OUTPATIENT
Start: 2025-08-05

## 2025-08-29 DIAGNOSIS — G62.9 NEUROPATHY: ICD-10-CM

## 2025-08-29 RX ORDER — PREGABALIN 150 MG/1
150 CAPSULE ORAL 2 TIMES DAILY
Qty: 180 CAPSULE | Refills: 0 | Status: SHIPPED | OUTPATIENT
Start: 2025-08-29 | End: 2026-02-25

## 2025-09-02 ENCOUNTER — OFFICE VISIT (OUTPATIENT)
Facility: CLINIC | Age: 55
End: 2025-09-02
Payer: MEDICARE

## 2025-09-02 VITALS
HEART RATE: 65 BPM | DIASTOLIC BLOOD PRESSURE: 83 MMHG | HEIGHT: 67 IN | WEIGHT: 239 LBS | RESPIRATION RATE: 18 BRPM | BODY MASS INDEX: 37.51 KG/M2 | TEMPERATURE: 97.4 F | OXYGEN SATURATION: 99 % | SYSTOLIC BLOOD PRESSURE: 125 MMHG

## 2025-09-02 DIAGNOSIS — E66.01 MORBID (SEVERE) OBESITY DUE TO EXCESS CALORIES (HCC): ICD-10-CM

## 2025-09-02 DIAGNOSIS — G40.909 SEIZURE DISORDER (HCC): ICD-10-CM

## 2025-09-02 DIAGNOSIS — I10 ESSENTIAL HYPERTENSION: ICD-10-CM

## 2025-09-02 DIAGNOSIS — N40.1 BENIGN PROSTATIC HYPERPLASIA WITH LOWER URINARY TRACT SYMPTOMS, SYMPTOM DETAILS UNSPECIFIED: ICD-10-CM

## 2025-09-02 DIAGNOSIS — G47.33 OSA (OBSTRUCTIVE SLEEP APNEA): ICD-10-CM

## 2025-09-02 DIAGNOSIS — R73.03 PREDIABETES: Primary | ICD-10-CM

## 2025-09-02 PROBLEM — S06.9XAA TBI (TRAUMATIC BRAIN INJURY) (HCC): Chronic | Status: RESOLVED | Noted: 2020-08-20 | Resolved: 2025-09-02

## 2025-09-02 PROCEDURE — 1036F TOBACCO NON-USER: CPT | Performed by: FAMILY MEDICINE

## 2025-09-02 PROCEDURE — G2211 COMPLEX E/M VISIT ADD ON: HCPCS | Performed by: FAMILY MEDICINE

## 2025-09-02 PROCEDURE — 3074F SYST BP LT 130 MM HG: CPT | Performed by: FAMILY MEDICINE

## 2025-09-02 PROCEDURE — 99214 OFFICE O/P EST MOD 30 MIN: CPT | Performed by: FAMILY MEDICINE

## 2025-09-02 PROCEDURE — 3017F COLORECTAL CA SCREEN DOC REV: CPT | Performed by: FAMILY MEDICINE

## 2025-09-02 PROCEDURE — G8427 DOCREV CUR MEDS BY ELIG CLIN: HCPCS | Performed by: FAMILY MEDICINE

## 2025-09-02 PROCEDURE — 3079F DIAST BP 80-89 MM HG: CPT | Performed by: FAMILY MEDICINE

## 2025-09-02 PROCEDURE — G8417 CALC BMI ABV UP PARAM F/U: HCPCS | Performed by: FAMILY MEDICINE

## 2025-09-02 ASSESSMENT — PATIENT HEALTH QUESTIONNAIRE - PHQ9
SUM OF ALL RESPONSES TO PHQ QUESTIONS 1-9: 0
1. LITTLE INTEREST OR PLEASURE IN DOING THINGS: NOT AT ALL
2. FEELING DOWN, DEPRESSED OR HOPELESS: NOT AT ALL

## 2025-09-02 ASSESSMENT — ENCOUNTER SYMPTOMS: SHORTNESS OF BREATH: 0
